# Patient Record
Sex: MALE | Employment: OTHER | URBAN - METROPOLITAN AREA
[De-identification: names, ages, dates, MRNs, and addresses within clinical notes are randomized per-mention and may not be internally consistent; named-entity substitution may affect disease eponyms.]

---

## 2019-05-02 ENCOUNTER — HOSPITAL ENCOUNTER (OUTPATIENT)
Age: 74
Setting detail: SPECIMEN
Discharge: HOME | End: 2019-05-02
Payer: MEDICARE

## 2019-05-02 LAB
INR BLD: 1.5
PROTHROMBIN TIME: 15.2 SEC (ref 9–12)

## 2019-05-09 ENCOUNTER — HOSPITAL ENCOUNTER (OUTPATIENT)
Age: 74
Setting detail: SPECIMEN
Discharge: HOME | End: 2019-05-09
Payer: MEDICARE

## 2019-05-09 LAB
INR BLD: 7.7
PROTHROMBIN TIME: 69.2 SEC (ref 9–12)

## 2019-05-10 ENCOUNTER — HOSPITAL ENCOUNTER (OUTPATIENT)
Age: 74
Setting detail: SPECIMEN
Discharge: HOME | End: 2019-05-10
Payer: MEDICARE

## 2019-05-10 LAB
INR BLD: 8
PROTHROMBIN TIME: 71.8 SEC (ref 9–12)

## 2019-05-13 ENCOUNTER — HOSPITAL ENCOUNTER (OUTPATIENT)
Age: 74
Setting detail: SPECIMEN
Discharge: HOME | End: 2019-05-13
Payer: MEDICARE

## 2019-05-13 LAB
INR BLD: 3.2
PROTHROMBIN TIME: 30.7 SEC (ref 9–12)

## 2019-05-16 ENCOUNTER — HOSPITAL ENCOUNTER (OUTPATIENT)
Age: 74
Setting detail: SPECIMEN
Discharge: HOME | End: 2019-05-16
Payer: MEDICARE

## 2019-05-16 LAB
INR BLD: 1.9
PROTHROMBIN TIME: 19.5 SEC (ref 9–12)

## 2019-05-20 ENCOUNTER — HOSPITAL ENCOUNTER (OUTPATIENT)
Age: 74
Setting detail: SPECIMEN
Discharge: HOME | End: 2019-05-20
Payer: MEDICARE

## 2019-05-20 LAB
INR BLD: 1.7
PROTHROMBIN TIME: 17.1 SEC (ref 9–12)

## 2019-06-04 ENCOUNTER — HOSPITAL ENCOUNTER (OUTPATIENT)
Age: 74
Setting detail: SPECIMEN
Discharge: HOME | End: 2019-06-04
Payer: MEDICARE

## 2019-06-04 LAB
INR BLD: 1.7
PROTHROMBIN TIME: 17 SEC (ref 9–12)

## 2019-06-10 ENCOUNTER — HOSPITAL ENCOUNTER (OUTPATIENT)
Age: 74
Setting detail: SPECIMEN
Discharge: HOME | End: 2019-06-10
Payer: MEDICARE

## 2019-06-10 LAB
INR BLD: 2.1
PROTHROMBIN TIME: 20.9 SEC (ref 9–12)

## 2019-06-24 ENCOUNTER — HOSPITAL ENCOUNTER (OUTPATIENT)
Age: 74
Setting detail: SPECIMEN
Discharge: HOME | End: 2019-06-24
Payer: MEDICARE

## 2019-06-24 LAB
INR BLD: 1.8
PROTHROMBIN TIME: 18.6 SEC (ref 9–12)

## 2020-03-04 ENCOUNTER — OFFICE VISIT (OUTPATIENT)
Dept: NEUROSURGERY | Age: 75
End: 2020-03-04
Payer: MEDICARE

## 2020-03-04 VITALS
HEIGHT: 72 IN | WEIGHT: 195.2 LBS | HEART RATE: 58 BPM | DIASTOLIC BLOOD PRESSURE: 77 MMHG | OXYGEN SATURATION: 96 % | BODY MASS INDEX: 26.44 KG/M2 | SYSTOLIC BLOOD PRESSURE: 126 MMHG

## 2020-03-04 PROCEDURE — G8427 DOCREV CUR MEDS BY ELIG CLIN: HCPCS | Performed by: NURSE PRACTITIONER

## 2020-03-04 PROCEDURE — 1123F ACP DISCUSS/DSCN MKR DOCD: CPT | Performed by: NURSE PRACTITIONER

## 2020-03-04 PROCEDURE — G8417 CALC BMI ABV UP PARAM F/U: HCPCS | Performed by: NURSE PRACTITIONER

## 2020-03-04 PROCEDURE — 1036F TOBACCO NON-USER: CPT | Performed by: NURSE PRACTITIONER

## 2020-03-04 PROCEDURE — G8484 FLU IMMUNIZE NO ADMIN: HCPCS | Performed by: NURSE PRACTITIONER

## 2020-03-04 PROCEDURE — 99204 OFFICE O/P NEW MOD 45 MIN: CPT | Performed by: NURSE PRACTITIONER

## 2020-03-04 PROCEDURE — 4040F PNEUMOC VAC/ADMIN/RCVD: CPT | Performed by: NURSE PRACTITIONER

## 2020-03-04 RX ORDER — DILTIAZEM HYDROCHLORIDE 180 MG/1
120 CAPSULE, COATED, EXTENDED RELEASE ORAL DAILY
COMMUNITY

## 2020-03-04 RX ORDER — ALLOPURINOL 100 MG/1
100 TABLET ORAL PRN
COMMUNITY
Start: 2019-04-24

## 2020-03-04 RX ORDER — CITALOPRAM 20 MG/1
20 TABLET ORAL DAILY
COMMUNITY
Start: 2019-05-25

## 2020-03-04 RX ORDER — TAMSULOSIN HYDROCHLORIDE 0.4 MG/1
0.4 CAPSULE ORAL DAILY
COMMUNITY
Start: 2020-01-30 | End: 2020-07-28

## 2020-03-04 RX ORDER — GLIMEPIRIDE 4 MG/1
4 TABLET ORAL DAILY
COMMUNITY
Start: 2019-04-24

## 2020-03-04 RX ORDER — ALPRAZOLAM 0.5 MG/1
0.5 TABLET ORAL PRN
COMMUNITY

## 2020-03-04 RX ORDER — EZETIMIBE 10 MG/1
10 TABLET ORAL DAILY
COMMUNITY

## 2020-03-04 RX ORDER — LISINOPRIL 20 MG/1
20 TABLET ORAL DAILY
COMMUNITY

## 2020-03-04 RX ORDER — PRAVASTATIN SODIUM 40 MG
40 TABLET ORAL DAILY
COMMUNITY
Start: 2019-04-24

## 2020-03-04 RX ORDER — CEFDINIR 300 MG/1
300 CAPSULE ORAL 2 TIMES DAILY
COMMUNITY

## 2020-03-04 RX ORDER — ALOGLIPTIN 12.5 MG/1
12.5 TABLET, FILM COATED ORAL DAILY
COMMUNITY

## 2020-03-04 RX ORDER — PANTOPRAZOLE SODIUM 40 MG/1
40 TABLET, DELAYED RELEASE ORAL DAILY
COMMUNITY
Start: 2019-04-24

## 2020-03-04 RX ORDER — ACETAMINOPHEN 325 MG/1
650 TABLET ORAL EVERY 4 HOURS PRN
COMMUNITY
Start: 2019-05-01

## 2020-03-04 RX ORDER — PRAVASTATIN SODIUM 20 MG
20 TABLET ORAL 2 TIMES DAILY
COMMUNITY

## 2020-03-04 RX ORDER — COLCHICINE 0.6 MG/1
0.6 CAPSULE ORAL 2 TIMES DAILY PRN
COMMUNITY
Start: 2019-04-24

## 2020-03-04 RX ORDER — GEMFIBROZIL 600 MG/1
600 TABLET, FILM COATED ORAL 2 TIMES DAILY
COMMUNITY

## 2020-03-04 NOTE — PROGRESS NOTES
[address] [id number]  Dept: [phone]    Patient:  Rometta Skiff  YOB: 1945  Date: 3/4/20    The patient is a 66 y.o. male who presents today for consult of the following problems:     Chief Complaint   Patient presents with    New Patient     Spinal Stenosis Cervical region     Results     MRI Cervical 2/19/20         HPI:     Rometta Skiff is a 66 y.o. male on whom neurosurgical consultation was requested by Ozzie Vieira MD for management of neck pain and arm symptoms. Patient has had issues with his neck for the last several years. Has been participating in physical therapy for the last month with great improvement in neck pain. Denies any numbness or tingling. No issues with dexterity, gait stability. Pain is tolerable at this point. Does have persistent decreased sensation to bilateral soles of feet secondary to diabetes. Reports that his diabetes is well controlled. 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 He has not had any previous brain imaging complete for comparison. Denies any issues with vision, dizziness, weight changes, nausea or headaches. Does not currently see an endocrinologist.    Nocturnal Awakening: Yes  Reason: pain/need to urinate    MYELOPATHY    Frequently dropping things: No  Difficulty with buttoning clothes, using zipper, putting on watch OR jewelry: No  Changes in handwriting: Yes  Numbness or tingling: No    LIMITATIONS    Pain significantly limiting on a daily basis   Daily pharmacologic pain control include: none  Neck : Arm pain: all neck pain    MANAGEMENT     Prior Surgery: No  Prior to 1yr ago:   In the last year:    Physical Therapy: Yes-PT, massage therapy, dry needling, cupping   Chiropractic Interventions: Yes   Injections: No  Improvement: n/a    Injections/response: n/a    History: refill  Neuro    Mentation  Appropriate affect  Registration intact  Orientation intact  3 item recall intact  Judgement intact to situation    Cranial Nerves:   Pupils equal and reactive to light  Extraocular motion intact  Face and shrug symmetric  Tongue midline  No dysarthria  v1-3 sensation symmetric, masseter tone symmetric  Hearing symmetric and intact    Sensation: intact-decreased to bilateral feet r/t diabetes  Ring finger split: negative    Motor  L deltoid 5/5; R deltoid 5/5  L biceps 5/5; R biceps 5/5  L triceps 5/5; R triceps 5/5  L wrist extension 5/5; R wrist extension 5/5  L intrinsics 5/5; R intrinsics 5/5     L iliopsoas 5/5 , R iliopsoas 5/5  L quadriceps 5/5; R quadriceps 5/5  L Dorsiflexion 5/5; R dorsiflexion 5/5  L Plantarflexion 5/5; R plantarflexion 5/5  L EHL 5/5; R EHL 5/5    Reflexes  L Brachioradialis 2+/4; R brachioradialis 2+/4  L Biceps 2+/4; R Biceps 2+/4  L Triceps 2+/4; R Triceps 2+/4  L Patellar 2+/4: R Patellar 2+/4  L Achilles 2+/4; R Achilles 2+/4    hoffmans L: neg  hoffmans R: neg  Clonus L: neg  Clonus R: neg  Babinski L: neg  Babinski R: neg    Spurlings: No  Lhermittes: No    Tinels at elbow: No  Tinels at wrist: No  Phalens: No  Ok sign: No  Froments: No  Benedictine Hand: No    Atrophy of thenar: No  Atrophy of hypothenar: No    Studies Review:     MRI brain 1/22/2020 (report only-images unavailable):  Rounded pituitary mass measuring about 11.5 mm in the cranial caudal     dimension.  This likely represents a nonfunctioning adenoma.          Chronic microvascular ischemic changes in the white matter.          No evidence of dural thickening or enhancement.  No enhancing brain lesions.     MRI cervical spine 1/23/2020 (report only-images unavailable):  Severe degenerative disc changes at C3-4 where there is disc space narrowing,    osteophyte formation and a broad-based posterior disc protrusion as well as    uncovertebral and facet osteophytes resulting in spinal stenosis and    foraminal stenosis unchanged from the previous exam.         Minimal degenerative anterolisthesis C6 relative to C7 with moderate left    foraminal stenosis.         No abnormal enhancement.         No abnormal signal in the spinal cord.         No evidence of infection. Assessment and Plan:      1. Pituitary adenoma ([address]    2. Cervical spondylosis    3. DISH (diffuse idiopathic skeletal hyperostosis)          Plan: Patient with new finding of presumed pituitary macroadenoma. Currently asymptomatic. Imaging unavailable, per report-no involvement of the optic chiasm. Referral provided for endocrinologist for evaluation. Will repeat MRI in 6 months for surveillance as there are no previous images for comparison. Additional recommendations to be provided once outside imaging received and reviewed. Longstanding issues with neck pain. Pain is exclusively axial in nature, no numbness, tingling or radiculopathy. No findings of myelopathy. Has been participating in physical therapy over the last month and has improved by >75%. At this point, favor continue therapy. Does have findings per report consistent with DISH. Has been told in the past that he has extensive osteophytes. Once images are available for review to determine degree of stenosis, will provide additional recommendations for repeat imaging or evaluation. Followup: Return in about 6 months (around 9/4/2020), or if symptoms worsen or fail to improve. Prescriptions Ordered:  No orders of the defined types were placed in this encounter.        Orders Placed:  Orders Placed This Encounter   Procedures    MRI Brain W WO Contrast     Standing Status:   Future     Standing Expiration Date:   3/4/2021     Order Specific Question:   Reason for exam:     Answer:   evaluate pituitary adenoma    External Referral To Endocrinology     Referral Priority:   Routine     Referral Type:   Eval and Treat     Referral Reason:   Specialty Services

## 2022-01-21 ENCOUNTER — HOSPITAL ENCOUNTER (OUTPATIENT)
Age: 77
Setting detail: SPECIMEN
Discharge: HOME | End: 2022-01-21

## 2022-01-22 LAB
PHENYTOIN DATE LAST DOSE: NORMAL
PHENYTOIN DOSE AMOUNT: NORMAL
PHENYTOIN DOSE TIME: NORMAL
PHENYTOIN FREE: 1.5 UG/ML (ref 1–2)
PHENYTOIN LEVEL: 12.6 UG/ML (ref 10–20)

## 2022-02-22 ENCOUNTER — HOSPITAL ENCOUNTER (OUTPATIENT)
Age: 77
Setting detail: SPECIMEN
Discharge: HOME | End: 2022-02-22

## 2022-02-23 LAB
PHENYTOIN DATE LAST DOSE: ABNORMAL
PHENYTOIN DOSE AMOUNT: ABNORMAL
PHENYTOIN DOSE TIME: ABNORMAL
PHENYTOIN FREE: 1 UG/ML (ref 1–2)
PHENYTOIN LEVEL: 8.6 UG/ML (ref 10–20)

## 2023-07-13 ENCOUNTER — OFFICE VISIT (OUTPATIENT)
Dept: PRIMARY CARE CLINIC | Age: 78
End: 2023-07-13
Payer: MEDICARE

## 2023-07-13 VITALS
WEIGHT: 168 LBS | TEMPERATURE: 97.5 F | SYSTOLIC BLOOD PRESSURE: 132 MMHG | DIASTOLIC BLOOD PRESSURE: 66 MMHG | OXYGEN SATURATION: 99 % | BODY MASS INDEX: 22.78 KG/M2 | HEART RATE: 64 BPM

## 2023-07-13 DIAGNOSIS — R60.0 PERIORBITAL EDEMA OF BOTH EYES: Primary | ICD-10-CM

## 2023-07-13 PROCEDURE — G8420 CALC BMI NORM PARAMETERS: HCPCS

## 2023-07-13 PROCEDURE — G8427 DOCREV CUR MEDS BY ELIG CLIN: HCPCS

## 2023-07-13 PROCEDURE — 1123F ACP DISCUSS/DSCN MKR DOCD: CPT

## 2023-07-13 PROCEDURE — 1036F TOBACCO NON-USER: CPT

## 2023-07-13 PROCEDURE — 96372 THER/PROPH/DIAG INJ SC/IM: CPT

## 2023-07-13 PROCEDURE — 99203 OFFICE O/P NEW LOW 30 MIN: CPT

## 2023-07-13 RX ORDER — TRIAMCINOLONE ACETONIDE 40 MG/ML
40 INJECTION, SUSPENSION INTRA-ARTICULAR; INTRAMUSCULAR ONCE
Status: COMPLETED | OUTPATIENT
Start: 2023-07-13 | End: 2023-07-13

## 2023-07-13 RX ADMIN — TRIAMCINOLONE ACETONIDE 40 MG: 40 INJECTION, SUSPENSION INTRA-ARTICULAR; INTRAMUSCULAR at 10:08

## 2023-07-13 ASSESSMENT — ENCOUNTER SYMPTOMS
TROUBLE SWALLOWING: 0
COUGH: 0
COLOR CHANGE: 1
SORE THROAT: 0
EYE PAIN: 0
EYE DISCHARGE: 0
EYE ITCHING: 1
FACIAL SWELLING: 1
EYE REDNESS: 1
SHORTNESS OF BREATH: 0
RHINORRHEA: 0

## 2023-07-13 NOTE — PATIENT INSTRUCTIONS
Continue with supportive treatment measures. Recommend cold compress application for 89-09 minutes 4 times daily. Continue taking daily allergy pill and Benadryl at night. Cleanse daily with wash cloth and baby shampoo. Follow-up if worsening or no improvement.

## 2023-07-20 ENCOUNTER — TELEPHONE (OUTPATIENT)
Dept: PRIMARY CARE CLINIC | Age: 78
End: 2023-07-20

## 2023-07-20 NOTE — TELEPHONE ENCOUNTER
Wife called stated patients eyes still itch pretty bad, patients swelling is gone, vision is good. Patient is applying cold compresses, using otc itch eye drops, and taking an otc allergy medication. Writer spoke with Dr. Bernardino Roa, Dr. Bernardino Roa advised patient to stop using the otc itch drops and use saline drops instead, continue cold compresses and call the eye doctor office to be evaluated. Call our office back with an update and/or any questions or concerns.

## 2023-07-21 ENCOUNTER — OFFICE VISIT (OUTPATIENT)
Dept: PRIMARY CARE CLINIC | Age: 78
End: 2023-07-21
Payer: MEDICARE

## 2023-07-21 VITALS
DIASTOLIC BLOOD PRESSURE: 78 MMHG | BODY MASS INDEX: 22.78 KG/M2 | TEMPERATURE: 97.6 F | WEIGHT: 168 LBS | OXYGEN SATURATION: 96 % | SYSTOLIC BLOOD PRESSURE: 110 MMHG | HEART RATE: 67 BPM

## 2023-07-21 DIAGNOSIS — H53.9 VISION CHANGES: ICD-10-CM

## 2023-07-21 DIAGNOSIS — L30.9 DERMATITIS: Primary | ICD-10-CM

## 2023-07-21 DIAGNOSIS — L28.0 NEURODERMATITIS: ICD-10-CM

## 2023-07-21 PROCEDURE — G8420 CALC BMI NORM PARAMETERS: HCPCS | Performed by: FAMILY MEDICINE

## 2023-07-21 PROCEDURE — 99214 OFFICE O/P EST MOD 30 MIN: CPT | Performed by: FAMILY MEDICINE

## 2023-07-21 PROCEDURE — 1123F ACP DISCUSS/DSCN MKR DOCD: CPT | Performed by: FAMILY MEDICINE

## 2023-07-21 PROCEDURE — G8428 CUR MEDS NOT DOCUMENT: HCPCS | Performed by: FAMILY MEDICINE

## 2023-07-21 PROCEDURE — 1036F TOBACCO NON-USER: CPT | Performed by: FAMILY MEDICINE

## 2023-07-21 RX ORDER — DIAPER,BRIEF,INFANT-TODD,DISP
EACH MISCELLANEOUS
Qty: 30 G | Refills: 0 | Status: SHIPPED | OUTPATIENT
Start: 2023-07-21 | End: 2023-07-28